# Patient Record
Sex: FEMALE | Race: WHITE | NOT HISPANIC OR LATINO | Employment: FULL TIME | URBAN - NONMETROPOLITAN AREA
[De-identification: names, ages, dates, MRNs, and addresses within clinical notes are randomized per-mention and may not be internally consistent; named-entity substitution may affect disease eponyms.]

---

## 2018-02-27 PROBLEM — R07.81 PLEURITIC CHEST PAIN: Status: ACTIVE | Noted: 2018-02-27

## 2023-07-24 ENCOUNTER — NURSE TRIAGE (OUTPATIENT)
Dept: CALL CENTER | Facility: HOSPITAL | Age: 28
End: 2023-07-24
Payer: COMMERCIAL

## 2023-07-24 NOTE — TELEPHONE ENCOUNTER
"Reason for Disposition  • Systolic BP  >= 160 OR Diastolic >= 100    Additional Information  • Negative: Difficult to awaken or acting confused (e.g., disoriented, slurred speech)  • Negative: SEVERE difficulty breathing (e.g., struggling for each breath, speaks in single words)  • Negative: [1] Weakness of the face, arm or leg on one side of the body AND [2] new-onset  • Negative: [1] Numbness (i.e., loss of sensation) of the face, arm or leg on one side of the body AND [2] new-onset  • Negative: [1] Chest pain lasts > 5 minutes AND [2] history of heart disease (i.e., heart attack, bypass surgery, angina, angioplasty, CHF)  • Negative: [1] Chest pain AND [2] took nitrogylcerin AND [3] pain was not relieved  • Negative: Sounds like a life-threatening emergency to the triager  • Negative: Symptom is main concern (e.g., headache, chest pain)  • Negative: Low blood pressure is main concern  • Negative: [1] Systolic BP  >= 160 OR Diastolic >= 100 AND [2] cardiac (e.g., breathing difficulty, chest pain) or neurologic symptoms (e.g., new-onset blurred or double vision, unsteady gait)  • Negative: [1] Pregnant 20 or more weeks (or postpartum < 6 weeks) AND [2] new hand or face swelling  • Negative: [1] Pregnant 20 or more weeks (or postpartum < 6 weeks) AND [2] Systolic BP >= 160 OR Diastolic >= 110  • Negative: [1] Systolic BP  >= 200 OR Diastolic >= 120 AND [2] having NO cardiac or neurologic symptoms  • Negative: [1] Pregnant 20 or more weeks (or postpartum < 6 weeks) AND [2] Systolic BP  >= 140 OR Diastolic >= 90  • Negative: [1] Systolic BP  >= 180 OR Diastolic >= 110 AND [2] missed most recent dose of blood pressure medication  • Negative: Systolic BP  >= 180 OR Diastolic >= 110  • Negative: Ran out of BP medications    Answer Assessment - Initial Assessment Questions  1. BLOOD PRESSURE: \"What is the blood pressure?\" \"Did you take at least two measurements 5 minutes apart?\"      150/100  2. ONSET: \"When did you take " "your blood pressure?\"      30 minutes ago  3. HOW: \"How did you take your blood pressure?\" (e.g., automatic home BP monitor, visiting nurse)      30 min ago  4. HISTORY: \"Do you have a history of high blood pressure?\"      Been up for a few weeks  5. MEDICINES: \"Are you taking any medicines for blood pressure?\" \"Have you missed any doses recently?\"      no  6. OTHER SYMPTOMS: \"Do you have any symptoms?\" (e.g., blurred vision, chest pain, difficulty breathing, headache, weakness)      no  7. PREGNANCY: \"Is there any chance you are pregnant?\" \"When was your last menstrual period?\"      no    Protocols used: Blood Pressure - High-ADULT-AH    "

## 2023-07-24 NOTE — TELEPHONE ENCOUNTER
Call transferred from [facility], caller has HTN of 150/100 today.  No symptoms.  It has been running 140's/90's for a few weeks and a APRN she saw did not want to start her on any meds due to her age but did not give her a plan.  She has a new pt appt for this Thursday.  When talking with caller she ate Mexican food last night.  Discussed hidden salt and dietary changes.  Discussed when she would need to be seen in the ER.

## 2023-07-27 ENCOUNTER — OFFICE VISIT (OUTPATIENT)
Dept: FAMILY MEDICINE CLINIC | Facility: CLINIC | Age: 28
End: 2023-07-27
Payer: COMMERCIAL

## 2023-07-27 VITALS
OXYGEN SATURATION: 94 % | HEART RATE: 64 BPM | HEIGHT: 70 IN | BODY MASS INDEX: 31.35 KG/M2 | SYSTOLIC BLOOD PRESSURE: 136 MMHG | DIASTOLIC BLOOD PRESSURE: 90 MMHG | WEIGHT: 219 LBS

## 2023-07-27 DIAGNOSIS — R74.8 ELEVATED LIVER ENZYMES: ICD-10-CM

## 2023-07-27 DIAGNOSIS — R73.03 PREDIABETES: ICD-10-CM

## 2023-07-27 DIAGNOSIS — Z11.59 ENCOUNTER FOR HCV SCREENING TEST FOR LOW RISK PATIENT: ICD-10-CM

## 2023-07-27 DIAGNOSIS — I10 PRIMARY HYPERTENSION: ICD-10-CM

## 2023-07-27 DIAGNOSIS — Z23 NEED FOR TDAP VACCINATION: ICD-10-CM

## 2023-07-27 DIAGNOSIS — Z13.220 SCREENING FOR HYPERLIPIDEMIA: ICD-10-CM

## 2023-07-27 DIAGNOSIS — Z12.4 CERVICAL CANCER SCREENING: ICD-10-CM

## 2023-07-27 DIAGNOSIS — E66.09 CLASS 1 OBESITY DUE TO EXCESS CALORIES WITH SERIOUS COMORBIDITY AND BODY MASS INDEX (BMI) OF 31.0 TO 31.9 IN ADULT: Primary | ICD-10-CM

## 2023-07-27 PROBLEM — E66.9 CLASS 1 OBESITY WITH SERIOUS COMORBIDITY IN ADULT: Status: ACTIVE | Noted: 2023-07-27

## 2023-07-27 PROBLEM — R07.81 PLEURITIC CHEST PAIN: Status: RESOLVED | Noted: 2018-02-27 | Resolved: 2023-07-27

## 2023-07-27 PROBLEM — F90.0 ATTENTION DEFICIT HYPERACTIVITY DISORDER (ADHD), PREDOMINANTLY INATTENTIVE TYPE: Status: ACTIVE | Noted: 2023-07-27

## 2023-07-27 NOTE — PROGRESS NOTES
"        Victor Hugo Briggs MD  Internal Medicine  [phone] (office)             07/27/2023    Patient Information  Cheryle Mack                                                                                          [address] [place] [address]  1995        Chief Complaint   Patient presents with    Establish Care    ADHD    Hypertension          History of Present Illness:    Cheryle Mack is a 27 y.o. female who presents to the office to establish care with new PCP.     She has untreated HTN. Patient denies prior history of this. She reports recent EKG unremarkable and previous sleep study negative. She is not exercising regularly. She denies tobacco or NSAID use.     Patient is due for HPV immunization series. She is due for Pap smear.     She reports recently being diagnosed with ADHD.     Allergies   Allergen Reactions    Penicillins          No current outpatient medications on file.      Social History     Socioeconomic History    Marital status:    Tobacco Use    Smoking status: Never    Smokeless tobacco: Never   Substance and Sexual Activity    Alcohol use: Yes     Alcohol/week: 2.0 standard drinks     Types: 1 Cans of beer, 1 Shots of liquor per week    Drug use: No    Sexual activity: Yes     Partners: Male     Birth control/protection: OCP, Condom         Vitals:    07/27/23 0758   BP: 136/90   BP Location: Right arm   Patient Position: Sitting   Cuff Size: Adult   Pulse: 64   SpO2: 94%   Weight: 99.3 kg (219 lb)   Height: 177.8 cm (70\")      Wt Readings from Last 3 Encounters:   07/27/23 99.3 kg (219 lb)   02/27/18 70.3 kg (155 lb)   02/21/17 67.9 kg (149 lb 9.6 oz)     Body mass index is 31.42 kg/m².      Physical Exam  Vitals reviewed.   Constitutional:       Appearance: Normal appearance. She is obese. She is not ill-appearing.   Pulmonary:      Effort: Pulmonary effort is normal.   Neurological:      Mental Status: She is alert and oriented to person, place, and " time.   Psychiatric:         Mood and Affect: Mood normal.         Behavior: Behavior normal.          Lab/other results:  Common labs          7/13/2023    16:28   Common Labs   WBC 8.35       Hemoglobin 13.5       Hematocrit 41.2       Platelets 314       Hemoglobin A1C 5.9          Details          This result is from an external source.               Assessment/Plan:    Diagnoses and all orders for this visit:    1. Class 1 obesity due to excess calories with serious comorbidity and body mass index (BMI) of 31.0 to 31.9 in adult (Primary)  Assessment & Plan:  Patient's (Body mass index is 31.42 kg/m².) indicates that they are obese (BMI >30) with health conditions that include hypertension and prediabetes and probably NAFLD  . Weight is worsening. BMI  is above average; BMI management plan is completed. We discussed portion control, increasing exercise, joining a fitness center or start home based exercise program, management of depression/anxiety/stress to control compensatory eating, and decreasing alcohol consumption.     Discussed ACC/AHA recommendations for cardiovascular and strength training and encouraged patient to start training plan.       2. Primary hypertension  Assessment & Plan:  Hypertension is newly identified.  Weight loss.  Regular aerobic exercise.  Blood pressure will be reassessed at the next regular appointment.    Above goal (<130/80mmHg). Will check urine microalbumin/Cr and renal US (given patient is a young F with newly diagnosed HTN), but suspect these will be unremarkable and HTN is part of metabolic syndrome. Follow-up in a few weeks to discuss results and plan.     Orders:  -     Microalbumin / Creatinine Urine Ratio - Urine, Clean Catch  -     Duplex Renal Artery - Bilateral Complete CAR; Future    3. Elevated liver enzymes  -     Hepatitis A Antibody, Total  -     Hepatitis B surface antigen  -     HBV Core Antibody, IgG / IgM Diff  -     Hepatitis B Surface Antibody    4.  Prediabetes  Assessment & Plan:  A1c 5.9%, discussed weight loss as above. Check A1c in one year.       5. Encounter for HCV screening test for low risk patient  -     Hepatitis C Virus Ab Cascade    6. Screening for hyperlipidemia  -     Lipid Panel  -     Apolipoprotein B    7. Cervical cancer screening  -     Ambulatory Referral to Gynecology    8. Need for Tdap vaccination  -     Tdap Vaccine => 8yo IM (BOOSTRIX)

## 2023-07-27 NOTE — ASSESSMENT & PLAN NOTE
Hypertension is newly identified.  Weight loss.  Regular aerobic exercise.  Blood pressure will be reassessed at the next regular appointment.    Above goal (<130/80mmHg). Will check urine microalbumin/Cr and renal US (given patient is a young F with newly diagnosed HTN), but suspect these will be unremarkable and HTN is part of metabolic syndrome. Follow-up in a few weeks to discuss results and plan.

## 2023-07-27 NOTE — ASSESSMENT & PLAN NOTE
Patient's (Body mass index is 31.42 kg/m².) indicates that they are obese (BMI >30) with health conditions that include hypertension and prediabetes and probably NAFLD  . Weight is worsening. BMI  is above average; BMI management plan is completed. We discussed portion control, increasing exercise, joining a fitness center or start home based exercise program, management of depression/anxiety/stress to control compensatory eating, and decreasing alcohol consumption.     Discussed ACC/AHA recommendations for cardiovascular and strength training and encouraged patient to start training plan.

## 2023-07-29 LAB
ALBUMIN/CREAT UR: 8 MG/G CREAT (ref 0–29)
APO B SERPL-MCNC: 58 MG/DL
CHOLEST SERPL-MCNC: 142 MG/DL (ref 100–199)
CREAT UR-MCNC: 174.3 MG/DL
HAV AB SER QL IA: POSITIVE
HBV CORE AB SERPL QL IA: NEGATIVE
HBV CORE IGM SERPL QL IA: NEGATIVE
HBV SURFACE AB SER QL: NON REACTIVE
HBV SURFACE AG SERPL QL IA: NEGATIVE
HCV AB SERPL QL IA: NORMAL
HCV IGG SERPL QL IA: NON REACTIVE
HDLC SERPL-MCNC: 55 MG/DL
LDLC SERPL CALC-MCNC: 76 MG/DL (ref 0–99)
MICROALBUMIN UR-MCNC: 14.6 UG/ML
TRIGL SERPL-MCNC: 51 MG/DL (ref 0–149)
VLDLC SERPL CALC-MCNC: 11 MG/DL (ref 5–40)

## 2023-08-01 ENCOUNTER — TELEPHONE (OUTPATIENT)
Dept: PEDIATRICS | Facility: OTHER | Age: 28
End: 2023-08-01

## 2023-08-01 NOTE — TELEPHONE ENCOUNTER
Called pt. Patient said that the scheduling department contacted her and she set up an appointment for Monday 7th

## 2023-08-01 NOTE — TELEPHONE ENCOUNTER
Caller: Cheryle Mack    Relationship: Self    Best call back number: [phone]     Who are you requesting to speak with (clinical staff, provider,  specific staff member): DR. HUYNH OR MA    What was the call regarding: PATIENT STATES SHE WAS INFORMED BY DR. HUYNH THAT HE WAS WANTING HER TO COMPLETE A KIDNEY ULTRASOUND BUT HASN'T HEARD ANYTHING ABOUT SCHEDULING IT.     PLEASE CALL AND ADVISE

## 2023-08-03 ENCOUNTER — TELEPHONE (OUTPATIENT)
Dept: FAMILY MEDICINE CLINIC | Facility: CLINIC | Age: 28
End: 2023-08-03

## 2023-08-03 NOTE — TELEPHONE ENCOUNTER
Hub staff attempted to follow warm transfer process and was unsuccessful     Caller: Cheryle Mack    Relationship to patient: Self    Best call back number: [phone]     Patient is needing: PATIENT IS RETURNING A CALL SHE MISSED FROM [facility]. PLEASE ADVISE.

## 2023-08-03 NOTE — TELEPHONE ENCOUNTER
Hub staff attempted to follow warm transfer process and was unsuccessful     Caller: Cheryle Mack    Relationship to patient: Self    Best call back number: [phone]     Patient is needing: RETURNING MOSHE'S PHONE CALL REGARDING TEST RESULTS.

## 2023-08-04 NOTE — TELEPHONE ENCOUNTER
Hub staff attempted to follow warm transfer process and was unsuccessful     Caller: Cheryle Mack    Relationship to patient: Self    Best call back number: [phone]    Patient is needing: PATIENT RETURNING CALL FROM [facility] TO DISCUSS TEST RESULTS.  SHE REQUESTS A CALLBACK.    PLEASE ADVISE.

## 2023-08-07 ENCOUNTER — HOSPITAL ENCOUNTER (OUTPATIENT)
Dept: CARDIOLOGY | Facility: HOSPITAL | Age: 28
Discharge: HOME | End: 2023-08-07
Admitting: INTERNAL MEDICINE
Payer: COMMERCIAL

## 2023-08-07 DIAGNOSIS — I10 PRIMARY HYPERTENSION: ICD-10-CM

## 2023-08-07 LAB
BH CV ECHO MEAS - DIST REN A EDV LEFT: 25.2 CM/S
BH CV ECHO MEAS - DIST REN A PSV LEFT: 68 CM/S
BH CV ECHO MEAS - MID REN A EDV LEFT: 40.9 CM/S
BH CV ECHO MEAS - MID REN A PSV LEFT: 114 CM/S
BH CV ECHO MEAS - PROX REN A EDV LEFT: 30.9 CM/S
BH CV ECHO MEAS - PROX REN A PSV LEFT: 90.6 CM/S
BH CV VAS BP LEFT ARM: NORMAL MMHG
BH CV VAS BP RIGHT ARM: NORMAL MMHG
BH CV VAS RENAL AORTIC MID EDV: 22.9 CM/S
BH CV VAS RENAL AORTIC MID PSV: 123 CM/S
BH CV VAS RENAL HILUM LEFT EDV: 18.6 CM/S
BH CV VAS RENAL HILUM LEFT PSV: 42.5 CM/S
BH CV VAS RENAL HILUM RIGHT EDV: 26.6 CM/S
BH CV VAS RENAL HILUM RIGHT PSV: 58.9 CM/S
BH CV XLRA MEAS - KID L LEFT: 11.7 CM
BH CV XLRA MEAS DIST REN A EDV RIGHT: 51.6 CM/S
BH CV XLRA MEAS DIST REN A PSV RIGHT: 115 CM/S
BH CV XLRA MEAS KID L RIGHT: 10.8 CM
BH CV XLRA MEAS KID W RIGHT: 4.6 CM
BH CV XLRA MEAS MID REN A EDV RIGHT: 39.6 CM/S
BH CV XLRA MEAS MID REN A PSV RIGHT: 103 CM/S
BH CV XLRA MEAS PROX REN A EDV RIGHT: 29.5 CM/S
BH CV XLRA MEAS PROX REN A PSV RIGHT: 73 CM/S
BH CV XLRA MEAS RAR LEFT: 0.93
BH CV XLRA MEAS RAR RIGHT: 0.93
BH CV XLRA MEAS RENAL A ORG EDV LEFT: 31.7 CM/S
BH CV XLRA MEAS RENAL A ORG EDV RIGHT: 19.7 CM/S
BH CV XLRA MEAS RENAL A ORG PSV LEFT: 74.4 CM/S
BH CV XLRA MEAS RENAL A ORG PSV RIGHT: 50.6 CM/S
LEFT KIDNEY WIDTH: 4.9 CM
LEFT RENAL UPPER PARENCHYMA MAX: 23.4 CM/S
LEFT RENAL UPPER PARENCHYMA MIN: 10.1 CM/S
LEFT RENAL UPPER PARENCHYMA RI: 0.57
RIGHT RENAL UPPER PARENCHYMA MAX: 21.4 CM/S
RIGHT RENAL UPPER PARENCHYMA MIN: 9.32 CM/S
RIGHT RENAL UPPER PARENCHYMA RI: 0.56

## 2023-08-07 PROCEDURE — 93975 VASCULAR STUDY: CPT

## 2023-08-17 ENCOUNTER — OFFICE VISIT (OUTPATIENT)
Dept: FAMILY MEDICINE CLINIC | Facility: CLINIC | Age: 28
End: 2023-08-17
Payer: COMMERCIAL

## 2023-08-17 VITALS
BODY MASS INDEX: 30.54 KG/M2 | DIASTOLIC BLOOD PRESSURE: 94 MMHG | HEIGHT: 70 IN | WEIGHT: 213.3 LBS | HEART RATE: 81 BPM | SYSTOLIC BLOOD PRESSURE: 130 MMHG | OXYGEN SATURATION: 98 %

## 2023-08-17 DIAGNOSIS — I10 PRIMARY HYPERTENSION: ICD-10-CM

## 2023-08-17 DIAGNOSIS — K76.0 NAFLD (NONALCOHOLIC FATTY LIVER DISEASE): ICD-10-CM

## 2023-08-17 DIAGNOSIS — R73.03 PREDIABETES: ICD-10-CM

## 2023-08-17 DIAGNOSIS — E66.09 CLASS 1 OBESITY DUE TO EXCESS CALORIES WITH SERIOUS COMORBIDITY AND BODY MASS INDEX (BMI) OF 30.0 TO 30.9 IN ADULT: Primary | ICD-10-CM

## 2023-08-17 PROCEDURE — 99214 OFFICE O/P EST MOD 30 MIN: CPT | Performed by: INTERNAL MEDICINE

## 2023-08-17 NOTE — ASSESSMENT & PLAN NOTE
Hypertension is unchanged.  Dietary sodium restriction.  Weight loss.  Regular aerobic exercise.  Blood pressure will be reassessed at the next regular appointment.    Will keep home blood pressure log and advised patient to bring to follow-up. If above goal (<130/80mmHg), will start treatment.

## 2023-08-17 NOTE — ASSESSMENT & PLAN NOTE
Patient's (Body mass index is 30.61 kg/mý.) indicates that they are obese (BMI >30) with health conditions that include hypertension and prediabetes . Weight is improving with lifestyle modifications. BMI  is above average; BMI management plan is completed. We discussed portion control, increasing exercise, joining a fitness center or start home based exercise program, management of depression/anxiety/stress to control compensatory eating, and decreasing alcohol consumption.     Praised efforts to date and encouraged patient to continue.

## 2024-01-12 ENCOUNTER — OFFICE VISIT (OUTPATIENT)
Dept: OBSTETRICS AND GYNECOLOGY | Age: 29
End: 2024-01-12
Payer: COMMERCIAL

## 2024-01-12 ENCOUNTER — PATIENT ROUNDING (BHMG ONLY) (OUTPATIENT)
Dept: OBSTETRICS AND GYNECOLOGY | Age: 29
End: 2024-01-12
Payer: COMMERCIAL

## 2024-01-12 VITALS
HEIGHT: 70 IN | SYSTOLIC BLOOD PRESSURE: 120 MMHG | DIASTOLIC BLOOD PRESSURE: 80 MMHG | WEIGHT: 215.4 LBS | BODY MASS INDEX: 30.84 KG/M2

## 2024-01-12 DIAGNOSIS — Z01.419 ENCOUNTER FOR ANNUAL ROUTINE GYNECOLOGICAL EXAMINATION: ICD-10-CM

## 2024-01-12 DIAGNOSIS — N64.4 BREAST PAIN, LEFT: ICD-10-CM

## 2024-01-12 DIAGNOSIS — Z23 NEED FOR HPV VACCINE: Primary | ICD-10-CM

## 2024-01-12 DIAGNOSIS — Z12.4 SCREENING FOR CERVICAL CANCER: ICD-10-CM

## 2024-01-12 NOTE — PROGRESS NOTES
"[facility]   Obstetrics and Gynecology     2024    Patient: Cheryle Mack          MR#:[id number]    History of Present Illness    Chief Complaint   Patient presents with    Gynecologic Exam     C/o : patient stated \" pain in left side breast , has been having on & off pain for a couple months ,described the pain sharp\".  Annual exam        28 y.o. female  who presents for annual exam. She has also been having some breast pain. Describes it as sharp pain on the outside of her left, has not noticed a change with menstrual cycles. Drinks 3 cups of coffee/ week. Has period every month, but it is somewhat irregular.     Relevant data reviewed:    Patient's last menstrual period was 2023 (approximate).  Obstetric History:  OB History          0    Para   0    Term   0       0    AB   0    Living   0         SAB   0    IAB   0    Ectopic   0    Molar        Multiple   0    Live Births                   Menstrual History:     Patient's last menstrual period was 2023 (approximate).       Social History     Substance and Sexual Activity   Sexual Activity Yes    Partners: Male    Birth control/protection: OCP, Condom     ______________________________________  Patient Active Problem List   Diagnosis    Prediabetes    Class 1 obesity with serious comorbidity in adult    Attention deficit hyperactivity disorder (ADHD), predominantly inattentive type    Primary hypertension    NAFLD (nonalcoholic fatty liver disease)     History reviewed. No pertinent past medical history.  Past Surgical History:   Procedure Laterality Date    WISDOM TOOTH EXTRACTION       Social History     Tobacco Use   Smoking Status Never   Smokeless Tobacco Never     Family History   Problem Relation Age of Onset    Ovarian cysts Mother     No Known Problems Father     No Known Problems Sister     No Known Problems Maternal Aunt     No Known Problems Maternal Uncle     Ovarian cysts Paternal Aunt     " Hypertension Maternal Grandmother     Heart disease Maternal Grandfather     Hyperlipidemia Maternal Grandfather     Hypertension Maternal Grandfather     No Known Problems Paternal Grandmother     Diabetes Paternal Grandfather     Heart disease Paternal Grandfather     Hyperlipidemia Paternal Grandfather     Hypertension Paternal Grandfather     No Known Problems Maternal Uncle     No Known Problems Maternal Uncle      Prior to Admission medications    Not on File     _______________________________________    Current contraception: none  History of abnormal Pap smear: no  Family history of uterine or ovarian cancer: no  Family History of colon cancer/colon polyps: no      The following portions of the patient's history were reviewed and updated as appropriate: allergies, current medications, past family history, past medical history, past social history, past surgical history, and problem list.        Pertinent items are noted in HPI.       Objective   Physical Exam  Vitals and nursing note reviewed. Exam conducted with a chaperone present.   Constitutional:       Appearance: Normal appearance.   HENT:      Head: Normocephalic and atraumatic.   Pulmonary:      Effort: Pulmonary effort is normal.   Chest:   Breasts:     Right: Normal.      Left: Normal.   Abdominal:      General: Abdomen is flat.      Palpations: Abdomen is soft.   Genitourinary:     Exam position: Lithotomy position.      Labia:         Right: No rash or lesion.         Left: No rash or lesion.       Vagina: Normal. No vaginal discharge or lesions.      Cervix: Normal. No lesion.      Uterus: Normal. Not tender.       Adnexa: Right adnexa normal and left adnexa normal.        Right: No mass or tenderness.          Left: No mass or tenderness.     Lymphadenopathy:      Upper Body:      Right upper body: No supraclavicular, axillary or pectoral adenopathy.      Left upper body: No supraclavicular, axillary or pectoral adenopathy.   Skin:      "General: Skin is warm and dry.   Neurological:      Mental Status: She is alert and oriented to person, place, and time.   Psychiatric:         Mood and Affect: Mood normal.         /80 (BP Location: Left arm, Patient Position: Sitting, Cuff Size: Adult)   Ht 177.8 cm (70\")   Wt 97.7 kg (215 lb 6.4 oz)   LMP 2023 (Approximate)   BMI 30.91 kg/m²    BP Readings from Last 3 Encounters:   24 120/80   23 130/94   23 136/90      Wt Readings from Last 3 Encounters:   24 97.7 kg (215 lb 6.4 oz)   23 96.8 kg (213 lb 4.8 oz)   23 99.3 kg (219 lb)        BMI: Estimated body mass index is 30.91 kg/m² as calculated from the following:    Height as of this encounter: 177.8 cm (70\").    Weight as of this encounter: 97.7 kg (215 lb 6.4 oz).    As part of wellness and prevention, the following topics were discussed with the patient:  Encouraged self breast exam  Contraception discussed.   Dental health discussed  Mental health discussed.   Vaccinations/immunizations addressed.           Problem List   Meds  History  Prep for Surg   Imagin}    Assessment:  28 y.o. female  who presents for annual exam.  Diagnoses and all orders for this visit:    1. Need for HPV vaccine (Primary)  -     HPV 9-Valent Recomb Vaccine suspension 0.5 mL    2. Breast pain, left  -     US Breast Left Complete; Future    3. Encounter for annual routine gynecological examination    4. Screening for cervical cancer  -     IGP, Apt HPV,rfx 16 / 18,45    - Pap collected today  - Mammo/Colonoscopy > not yet indicated  - Vaccine recs reviewed  - STI screening declined    Plan:  No follow-ups on file.    Fallon Diez MD  2024 12:32 EST  "

## 2024-01-17 LAB
CYTOLOGIST CVX/VAG CYTO: NORMAL
CYTOLOGY CVX/VAG DOC CYTO: NORMAL
CYTOLOGY CVX/VAG DOC THIN PREP: NORMAL
DX ICD CODE: NORMAL
HIV 1 & 2 AB SER-IMP: NORMAL
HPV I/H RISK 4 DNA CVX QL PROBE+SIG AMP: NEGATIVE
OTHER STN SPEC: NORMAL
STAT OF ADQ CVX/VAG CYTO-IMP: NORMAL

## 2024-01-25 ENCOUNTER — HOSPITAL ENCOUNTER (OUTPATIENT)
Dept: ULTRASOUND IMAGING | Facility: HOSPITAL | Age: 29
End: 2024-01-25
Payer: COMMERCIAL